# Patient Record
Sex: MALE | Race: WHITE | NOT HISPANIC OR LATINO | URBAN - METROPOLITAN AREA
[De-identification: names, ages, dates, MRNs, and addresses within clinical notes are randomized per-mention and may not be internally consistent; named-entity substitution may affect disease eponyms.]

---

## 2020-07-25 ENCOUNTER — TELEPHONE ENCOUNTER (OUTPATIENT)
Dept: URBAN - METROPOLITAN AREA CLINIC 13 | Facility: CLINIC | Age: 70
End: 2020-07-25

## 2020-07-26 ENCOUNTER — TELEPHONE ENCOUNTER (OUTPATIENT)
Dept: URBAN - METROPOLITAN AREA CLINIC 13 | Facility: CLINIC | Age: 70
End: 2020-07-26

## 2020-07-26 RX ORDER — FAMOTIDINE 40 MG/1
TABLET ORAL
Qty: 90 | Refills: 0 | Status: ACTIVE | COMMUNITY
Start: 2018-12-12

## 2020-07-26 RX ORDER — IBUPROFEN 200 MG/1
TAKE 1 TABLET 3 TIMES DAILY AS NEEDED TABLET, FILM COATED ORAL
Refills: 0 | Status: ACTIVE | COMMUNITY

## 2020-07-26 RX ORDER — TRIAMCINOLONE ACETONIDE 1 MG/G
CREAM TOPICAL
Qty: 15 | Refills: 0 | Status: ACTIVE | COMMUNITY
Start: 2019-05-07

## 2020-09-01 ENCOUNTER — OFFICE VISIT (OUTPATIENT)
Dept: URBAN - METROPOLITAN AREA CLINIC 113 | Facility: CLINIC | Age: 70
End: 2020-09-01

## 2021-12-17 ENCOUNTER — TELEPHONE ENCOUNTER (OUTPATIENT)
Dept: URBAN - METROPOLITAN AREA CLINIC 113 | Facility: CLINIC | Age: 71
End: 2021-12-17

## 2022-01-06 ENCOUNTER — OFFICE VISIT (OUTPATIENT)
Dept: URBAN - METROPOLITAN AREA CLINIC 113 | Facility: CLINIC | Age: 72
End: 2022-01-06
Payer: MEDICARE

## 2022-01-06 ENCOUNTER — LAB OUTSIDE AN ENCOUNTER (OUTPATIENT)
Dept: URBAN - METROPOLITAN AREA CLINIC 113 | Facility: CLINIC | Age: 72
End: 2022-01-06

## 2022-01-06 ENCOUNTER — WEB ENCOUNTER (OUTPATIENT)
Dept: URBAN - METROPOLITAN AREA CLINIC 113 | Facility: CLINIC | Age: 72
End: 2022-01-06

## 2022-01-06 ENCOUNTER — TELEPHONE ENCOUNTER (OUTPATIENT)
Dept: URBAN - METROPOLITAN AREA CLINIC 113 | Facility: CLINIC | Age: 72
End: 2022-01-06

## 2022-01-06 VITALS
TEMPERATURE: 98.2 F | DIASTOLIC BLOOD PRESSURE: 70 MMHG | WEIGHT: 171 LBS | HEART RATE: 66 BPM | BODY MASS INDEX: 23.16 KG/M2 | RESPIRATION RATE: 18 BRPM | SYSTOLIC BLOOD PRESSURE: 119 MMHG | HEIGHT: 72 IN

## 2022-01-06 DIAGNOSIS — K21.9 GASTROESOPHAGEAL REFLUX DISEASE, UNSPECIFIED WHETHER ESOPHAGITIS PRESENT: ICD-10-CM

## 2022-01-06 DIAGNOSIS — Z86.010 HISTORY OF COLON POLYPS: ICD-10-CM

## 2022-01-06 PROCEDURE — 99214 OFFICE O/P EST MOD 30 MIN: CPT | Performed by: INTERNAL MEDICINE

## 2022-01-06 RX ORDER — TRIAMCINOLONE ACETONIDE 1 MG/G
CREAM TOPICAL
Qty: 15 | Refills: 0 | Status: DISCONTINUED | COMMUNITY
Start: 2019-05-07

## 2022-01-06 RX ORDER — FAMOTIDINE 40 MG/1
TABLET ORAL
Qty: 90 | Refills: 0 | Status: DISCONTINUED | COMMUNITY
Start: 2018-12-12

## 2022-01-06 RX ORDER — IBUPROFEN 200 MG/1
TAKE 1 TABLET 3 TIMES DAILY AS NEEDED TABLET, FILM COATED ORAL
Refills: 0 | Status: DISCONTINUED | COMMUNITY

## 2022-01-06 NOTE — EXAM-PHYSICAL EXAM
He is alert and oriented to person place and situation no acute distress. There is no scleral icterus.

## 2022-01-25 ENCOUNTER — OFFICE VISIT (OUTPATIENT)
Dept: URBAN - METROPOLITAN AREA CLINIC 113 | Facility: CLINIC | Age: 72
End: 2022-01-25
Payer: MEDICARE

## 2022-01-25 ENCOUNTER — TELEPHONE ENCOUNTER (OUTPATIENT)
Dept: URBAN - METROPOLITAN AREA CLINIC 113 | Facility: CLINIC | Age: 72
End: 2022-01-25

## 2022-01-25 VITALS
TEMPERATURE: 97.3 F | SYSTOLIC BLOOD PRESSURE: 156 MMHG | WEIGHT: 163 LBS | DIASTOLIC BLOOD PRESSURE: 87 MMHG | BODY MASS INDEX: 22.08 KG/M2 | HEART RATE: 86 BPM | HEIGHT: 72 IN

## 2022-01-25 DIAGNOSIS — R10.13 EPIGASTRIC PAIN: ICD-10-CM

## 2022-01-25 DIAGNOSIS — R11.0 NAUSEA: ICD-10-CM

## 2022-01-25 DIAGNOSIS — R19.4 CHANGE IN BOWEL HABITS: ICD-10-CM

## 2022-01-25 DIAGNOSIS — Z86.010 HISTORY OF COLON POLYPS: ICD-10-CM

## 2022-01-25 DIAGNOSIS — K21.9 GASTROESOPHAGEAL REFLUX DISEASE, UNSPECIFIED WHETHER ESOPHAGITIS PRESENT: ICD-10-CM

## 2022-01-25 PROCEDURE — 99214 OFFICE O/P EST MOD 30 MIN: CPT | Performed by: INTERNAL MEDICINE

## 2022-01-25 RX ORDER — METRONIDAZOLE 500 MG/1
1 TABLET TABLET ORAL THREE TIMES A DAY
Qty: 21 TABLET | Refills: 0 | OUTPATIENT
Start: 2022-01-25 | End: 2022-02-01

## 2022-01-25 NOTE — EXAM-PHYSICAL EXAM
He is alert and oriented to person place and situation no acute distress.  There is no scleral icterus.  Abdomen is soft nondistended with some minimal epigastric tenderness to deep palpation.  No masses or organomegaly are appreciated.

## 2022-01-25 NOTE — HPI-TODAY'S VISIT:
Patient is a very pleasant 71-year-old gentleman who returns today for follow-up.  I last saw the patient in August 2019.  At that time I was seeing him with abdominal pain.  Extensive work-up only revealed gallbladder sludge.  Celiac panel was negative.  Laboratory testing performed in October 2021 revealed a normal CBC.  Also a normal CMP other than a sodium of 136.  Patient underwent cholecystectomy in November 2021. The patient states that both before his cholecystectomy and after he is having problems of heartburn in the evening especially if he has a mixed drink. He often has to sleep with the head of the bed up. He does not take any acid suppressive medications. He does take occasional nonsteroidal anti-inflammatory agents. He states his last colonoscopy was 5 years ago and 2 polyps were found and he was told to have a repeat in 5 years. The patient comes in with a new problem.  He states he went to [place] a few weeks ago to do some surfing.  While he was there he developed severe right arm swelling.  This has improved dramatically.  He thought this might be gout so he took prednisone tablets.  After that he started developing severe epigastric abdominal pain with nausea and vomiting.  This pain is made much worse with eating.  He has had no diarrhea.

## 2022-01-26 LAB
A/G RATIO: 1.5
ALBUMIN: 4.4
ALKALINE PHOSPHATASE: 91
ALT (SGPT): 22
AMYLASE: 75
AST (SGOT): 20
BASO (ABSOLUTE): 0
BASOS: 0
BILIRUBIN, TOTAL: 0.6
BUN/CREATININE RATIO: 20
BUN: 19
CALCIUM: 10.3
CARBON DIOXIDE, TOTAL: 26
CHLORIDE: 97
CREATININE: 0.97
EGFR IF AFRICN AM: 90
EGFR IF NONAFRICN AM: 78
EOS (ABSOLUTE): 0
EOS: 0
GLOBULIN, TOTAL: 3
GLUCOSE: 113
HEMATOCRIT: 40.4
HEMATOLOGY COMMENTS:: (no result)
HEMOGLOBIN: 13.7
IMMATURE CELLS: (no result)
IMMATURE GRANS (ABS): 0
IMMATURE GRANULOCYTES: 0
LYMPHS (ABSOLUTE): 1.3
LYMPHS: 11
MCH: 28.8
MCHC: 33.9
MCV: 85
MONOCYTES(ABSOLUTE): 0.9
MONOCYTES: 8
NEUTROPHILS (ABSOLUTE): 9
NEUTROPHILS: 81
NRBC: (no result)
PLATELETS: 418
POTASSIUM: 3.7
PROTEIN, TOTAL: 7.4
RBC: 4.76
RDW: 13.3
SODIUM: 142
WBC: 11.3

## 2022-01-27 ENCOUNTER — TELEPHONE ENCOUNTER (OUTPATIENT)
Dept: URBAN - METROPOLITAN AREA CLINIC 113 | Facility: CLINIC | Age: 72
End: 2022-01-27

## 2022-01-31 ENCOUNTER — TELEPHONE ENCOUNTER (OUTPATIENT)
Dept: URBAN - METROPOLITAN AREA CLINIC 113 | Facility: CLINIC | Age: 72
End: 2022-01-31

## 2022-02-07 ENCOUNTER — OFFICE VISIT (OUTPATIENT)
Dept: URBAN - METROPOLITAN AREA SURGERY CENTER 25 | Facility: SURGERY CENTER | Age: 72
End: 2022-02-07
Payer: MEDICARE

## 2022-02-07 ENCOUNTER — TELEPHONE ENCOUNTER (OUTPATIENT)
Dept: URBAN - METROPOLITAN AREA CLINIC 113 | Facility: CLINIC | Age: 72
End: 2022-02-07

## 2022-02-07 ENCOUNTER — CLAIMS CREATED FROM THE CLAIM WINDOW (OUTPATIENT)
Dept: URBAN - METROPOLITAN AREA CLINIC 4 | Facility: CLINIC | Age: 72
End: 2022-02-07
Payer: MEDICARE

## 2022-02-07 ENCOUNTER — LAB OUTSIDE AN ENCOUNTER (OUTPATIENT)
Dept: URBAN - METROPOLITAN AREA CLINIC 113 | Facility: CLINIC | Age: 72
End: 2022-02-07

## 2022-02-07 DIAGNOSIS — K31.89 STENOSIS OF STOMACH: ICD-10-CM

## 2022-02-07 DIAGNOSIS — K31.89 FOCAL FOVEOLAR HYPERPLASIA: ICD-10-CM

## 2022-02-07 DIAGNOSIS — K31.1 PYLORIC STENOSIS: ICD-10-CM

## 2022-02-07 DIAGNOSIS — K25.7 CHRONIC GASTRIC ULCER WITHOUT HEMORRHAGE OR PERFORATION: ICD-10-CM

## 2022-02-07 PROCEDURE — 88305 TISSUE EXAM BY PATHOLOGIST: CPT | Performed by: PATHOLOGY

## 2022-02-07 PROCEDURE — 88342 IMHCHEM/IMCYTCHM 1ST ANTB: CPT | Performed by: PATHOLOGY

## 2022-02-07 PROCEDURE — 43239 EGD BIOPSY SINGLE/MULTIPLE: CPT | Performed by: INTERNAL MEDICINE

## 2022-02-07 PROCEDURE — G8907 PT DOC NO EVENTS ON DISCHARG: HCPCS | Performed by: INTERNAL MEDICINE

## 2022-02-08 ENCOUNTER — TELEPHONE ENCOUNTER (OUTPATIENT)
Dept: URBAN - METROPOLITAN AREA CLINIC 113 | Facility: CLINIC | Age: 72
End: 2022-02-08

## 2022-02-14 ENCOUNTER — OFFICE VISIT (OUTPATIENT)
Dept: URBAN - METROPOLITAN AREA MEDICAL CENTER 19 | Facility: MEDICAL CENTER | Age: 72
End: 2022-02-14
Payer: MEDICARE

## 2022-02-14 DIAGNOSIS — K31.1: ICD-10-CM

## 2022-02-14 DIAGNOSIS — K22.89 ESOPHAGEAL DILATATION: ICD-10-CM

## 2022-02-14 PROCEDURE — 43239 EGD BIOPSY SINGLE/MULTIPLE: CPT | Performed by: INTERNAL MEDICINE

## 2022-02-16 ENCOUNTER — TELEPHONE ENCOUNTER (OUTPATIENT)
Dept: URBAN - METROPOLITAN AREA CLINIC 113 | Facility: CLINIC | Age: 72
End: 2022-02-16

## 2022-02-21 ENCOUNTER — TELEPHONE ENCOUNTER (OUTPATIENT)
Dept: URBAN - METROPOLITAN AREA CLINIC 113 | Facility: CLINIC | Age: 72
End: 2022-02-21

## 2022-03-21 ENCOUNTER — TELEPHONE ENCOUNTER (OUTPATIENT)
Dept: URBAN - METROPOLITAN AREA CLINIC 113 | Facility: CLINIC | Age: 72
End: 2022-03-21

## 2022-03-22 ENCOUNTER — TELEPHONE ENCOUNTER (OUTPATIENT)
Dept: URBAN - METROPOLITAN AREA CLINIC 113 | Facility: CLINIC | Age: 72
End: 2022-03-22

## 2022-06-20 ENCOUNTER — TELEPHONE ENCOUNTER (OUTPATIENT)
Dept: URBAN - METROPOLITAN AREA CLINIC 113 | Facility: CLINIC | Age: 72
End: 2022-06-20

## 2022-10-03 ENCOUNTER — TELEPHONE ENCOUNTER (OUTPATIENT)
Dept: URBAN - METROPOLITAN AREA CLINIC 113 | Facility: CLINIC | Age: 72
End: 2022-10-03

## 2022-10-03 ENCOUNTER — WEB ENCOUNTER (OUTPATIENT)
Dept: URBAN - METROPOLITAN AREA CLINIC 113 | Facility: CLINIC | Age: 72
End: 2022-10-03

## 2022-10-04 ENCOUNTER — CLAIMS CREATED FROM THE CLAIM WINDOW (OUTPATIENT)
Dept: URBAN - METROPOLITAN AREA MEDICAL CENTER 19 | Facility: MEDICAL CENTER | Age: 72
End: 2022-10-04
Payer: MEDICARE

## 2022-10-04 ENCOUNTER — OFFICE VISIT (OUTPATIENT)
Dept: URBAN - METROPOLITAN AREA CLINIC 113 | Facility: CLINIC | Age: 72
End: 2022-10-04

## 2022-10-04 ENCOUNTER — TELEPHONE ENCOUNTER (OUTPATIENT)
Dept: URBAN - METROPOLITAN AREA CLINIC 113 | Facility: CLINIC | Age: 72
End: 2022-10-04

## 2022-10-04 DIAGNOSIS — N17.9 AKI (ACUTE KIDNEY INJURY): ICD-10-CM

## 2022-10-04 DIAGNOSIS — D72.828 HIGH BLOOD MONOCYTE COUNT: ICD-10-CM

## 2022-10-04 DIAGNOSIS — A09 INFECTIOUS GASTROENTERITIS AND COLITIS, UNSPECIFIED: ICD-10-CM

## 2022-10-04 DIAGNOSIS — Z98.890 H/O ABDOMINAL SURGERY: ICD-10-CM

## 2022-10-04 DIAGNOSIS — E87.1 HYPONATREMIA: ICD-10-CM

## 2022-10-04 PROCEDURE — 99222 1ST HOSP IP/OBS MODERATE 55: CPT | Performed by: INTERNAL MEDICINE

## 2022-10-05 ENCOUNTER — CLAIMS CREATED FROM THE CLAIM WINDOW (OUTPATIENT)
Dept: URBAN - METROPOLITAN AREA MEDICAL CENTER 19 | Facility: MEDICAL CENTER | Age: 72
End: 2022-10-05
Payer: MEDICARE

## 2022-10-05 DIAGNOSIS — M10.9 GOUT ATTACK: ICD-10-CM

## 2022-10-05 DIAGNOSIS — N17.9 AKI (ACUTE KIDNEY INJURY): ICD-10-CM

## 2022-10-05 DIAGNOSIS — D72.828 HIGH BLOOD MONOCYTE COUNT: ICD-10-CM

## 2022-10-05 DIAGNOSIS — A09 INFECTIOUS GASTROENTERITIS AND COLITIS, UNSPECIFIED: ICD-10-CM

## 2022-10-05 PROCEDURE — 99232 SBSQ HOSP IP/OBS MODERATE 35: CPT | Performed by: INTERNAL MEDICINE

## 2022-10-06 ENCOUNTER — TELEPHONE ENCOUNTER (OUTPATIENT)
Dept: URBAN - METROPOLITAN AREA CLINIC 113 | Facility: CLINIC | Age: 72
End: 2022-10-06

## 2022-10-18 ENCOUNTER — WEB ENCOUNTER (OUTPATIENT)
Dept: URBAN - METROPOLITAN AREA CLINIC 113 | Facility: CLINIC | Age: 72
End: 2022-10-18

## 2022-10-18 ENCOUNTER — OFFICE VISIT (OUTPATIENT)
Dept: URBAN - METROPOLITAN AREA CLINIC 113 | Facility: CLINIC | Age: 72
End: 2022-10-18
Payer: MEDICARE

## 2022-10-18 VITALS
HEIGHT: 72 IN | SYSTOLIC BLOOD PRESSURE: 104 MMHG | TEMPERATURE: 97.5 F | HEART RATE: 92 BPM | RESPIRATION RATE: 16 BRPM | BODY MASS INDEX: 21.64 KG/M2 | DIASTOLIC BLOOD PRESSURE: 69 MMHG | WEIGHT: 159.8 LBS

## 2022-10-18 DIAGNOSIS — K21.9 GASTROESOPHAGEAL REFLUX DISEASE, UNSPECIFIED WHETHER ESOPHAGITIS PRESENT: ICD-10-CM

## 2022-10-18 DIAGNOSIS — K31.1 PYLORIC STENOSIS IN ADULT: ICD-10-CM

## 2022-10-18 DIAGNOSIS — Z86.010 HISTORY OF COLON POLYPS: ICD-10-CM

## 2022-10-18 DIAGNOSIS — R19.7 DIARRHEA OF PRESUMED INFECTIOUS ORIGIN: ICD-10-CM

## 2022-10-18 PROCEDURE — 99214 OFFICE O/P EST MOD 30 MIN: CPT | Performed by: INTERNAL MEDICINE

## 2022-10-18 RX ORDER — COLCHICINE 0.6 MG/1
1 TABLET TABLET, FILM COATED ORAL
Status: ACTIVE | COMMUNITY

## 2022-10-18 RX ORDER — ALLOPURINOL 100 MG/1
1 TABLET TABLET ORAL ONCE A DAY
Status: ACTIVE | COMMUNITY

## 2022-10-18 NOTE — EXAM-PHYSICAL EXAM
He is alert and oriented to person place and situation no acute distress.  The somewhat pale in appearance.

## 2022-10-18 NOTE — HPI-TODAY'S VISIT:
Patient is a very pleasant 71-year-old gentleman who returns today for follow-up.  I last saw the patient in August 2019.  At that time I was seeing him with abdominal pain.  Extensive work-up only revealed gallbladder sludge.  Celiac panel was negative.  Laboratory testing performed in October 2021 revealed a normal CBC.  Also a normal CMP other than a sodium of 136.  Patient underwent cholecystectomy in November 2021. The patient states that both before his cholecystectomy and after he is having problems of heartburn in the evening especially if he has a mixed drink. He often has to sleep with the head of the bed up. He does not take any acid suppressive medications. He does take occasional nonsteroidal anti-inflammatory agents. He states his last colonoscopy was 5 years ago and 2 polyps were found and he was told to have a repeat in 5 years. The patient comes in with a new problem.  He states he went to [place] a few weeks ago to do some surfing.  While he was there he developed severe right arm swelling.  This has improved dramatically.  He thought this might be gout so he took prednisone tablets.  After that he started developing severe epigastric abdominal pain with nausea and vomiting.  This pain is made much worse with eating.  He has had no diarrhea. Interval history.  EGD performed February 7 revealed a tight pylorus stenosis.  In the interim he underwent surgical resection.  He was recently seen in consultation October 4 for diarrhea.  He had a high white cell count.  This was consistent with an acute gastroenteritis.  He resolved with fluid resuscitation.  He had acute renal failure associated with it. The patient was doing well up until 1 to 2 days ago when he started developing diarrhea bloating and some nausea.  He does not note any exposure.  His wife is not ill.  He did resume colchicine once he returned home.

## 2022-10-19 ENCOUNTER — WEB ENCOUNTER (OUTPATIENT)
Dept: URBAN - METROPOLITAN AREA CLINIC 113 | Facility: CLINIC | Age: 72
End: 2022-10-19

## 2022-10-19 ENCOUNTER — TELEPHONE ENCOUNTER (OUTPATIENT)
Dept: URBAN - METROPOLITAN AREA CLINIC 113 | Facility: CLINIC | Age: 72
End: 2022-10-19

## 2022-10-19 LAB
A/G RATIO: 1.5
ABSOLUTE BASOPHILS: 18
ABSOLUTE EOSINOPHILS: 644
ABSOLUTE LYMPHOCYTES: 1987
ABSOLUTE MONOCYTES: 1343
ABSOLUTE NEUTROPHILS: (no result)
ALBUMIN: 3.9
ALKALINE PHOSPHATASE: 129
ALT (SGPT): 17
AST (SGOT): 15
BASOPHILS: 0.1
BILIRUBIN, TOTAL: 0.9
BUN/CREATININE RATIO: 28
BUN: 36
CALCIUM: 9.3
CARBON DIOXIDE, TOTAL: 20
CHLORIDE: 99
CREATININE: 1.27
EGFR: 60
EOSINOPHILS: 3.5
GLOBULIN, TOTAL: 2.6
GLUCOSE: 99
HEMATOCRIT: 44
HEMOGLOBIN: 15
LYMPHOCYTES: 10.8
MAGNESIUM: 1.8
MCH: 29.4
MCHC: 34.1
MCV: 86.1
MONOCYTES: 7.3
MPV: 9.4
NEUTROPHILS: 78.3
PLATELET COUNT: 412
POTASSIUM: 4.7
PROTEIN, TOTAL: 6.5
RDW: 12.8
RED BLOOD CELL COUNT: 5.11
SODIUM: 133
WHITE BLOOD CELL COUNT: 18.4

## 2022-10-20 ENCOUNTER — CLAIMS CREATED FROM THE CLAIM WINDOW (OUTPATIENT)
Dept: URBAN - METROPOLITAN AREA MEDICAL CENTER 43 | Facility: MEDICAL CENTER | Age: 72
End: 2022-10-20
Payer: MEDICARE

## 2022-10-20 DIAGNOSIS — R19.7 ACUTE DIARRHEA: ICD-10-CM

## 2022-10-20 DIAGNOSIS — Z86.010 ADENOMAS PERSONAL HISTORY OF COLONIC POLYPS: ICD-10-CM

## 2022-10-20 DIAGNOSIS — N17.8 OTHER ACUTE KIDNEY FAILURE: ICD-10-CM

## 2022-10-20 DIAGNOSIS — D72.829 ELEVATED WBC COUNT: ICD-10-CM

## 2022-10-20 PROCEDURE — 99223 1ST HOSP IP/OBS HIGH 75: CPT | Performed by: PHYSICIAN ASSISTANT

## 2022-10-21 ENCOUNTER — CLAIMS CREATED FROM THE CLAIM WINDOW (OUTPATIENT)
Dept: URBAN - METROPOLITAN AREA MEDICAL CENTER 43 | Facility: MEDICAL CENTER | Age: 72
End: 2022-10-21
Payer: MEDICARE

## 2022-10-21 DIAGNOSIS — K21.9 ACID REFLUX: ICD-10-CM

## 2022-10-21 DIAGNOSIS — R19.7 DIARRHEA, UNSPECIFIED: ICD-10-CM

## 2022-10-21 DIAGNOSIS — N17.8 OTHER ACUTE KIDNEY FAILURE: ICD-10-CM

## 2022-10-21 DIAGNOSIS — D72.829 ELEVATED WBC COUNT: ICD-10-CM

## 2022-10-21 LAB
CAMPYLOBACTER SPP. AG,EIA: (no result)
OVA AND PARASITES, CONC AND PERM SMEAR: (no result)
SALMONELLA AND SHIGELLA, CULTURE: (no result)
SHIGA TOXINS, EIA W/RFL TO E.COLI O157 CULTURE: (no result)

## 2022-10-21 PROCEDURE — 99232 SBSQ HOSP IP/OBS MODERATE 35: CPT | Performed by: PHYSICIAN ASSISTANT

## 2022-10-22 ENCOUNTER — CLAIMS CREATED FROM THE CLAIM WINDOW (OUTPATIENT)
Dept: URBAN - METROPOLITAN AREA MEDICAL CENTER 43 | Facility: MEDICAL CENTER | Age: 72
End: 2022-10-22
Payer: MEDICARE

## 2022-10-22 DIAGNOSIS — D72.10 EOSINOPHILIA: ICD-10-CM

## 2022-10-22 DIAGNOSIS — K21.9 ACID REFLUX: ICD-10-CM

## 2022-10-22 DIAGNOSIS — R19.7 DIARRHEA, UNSPECIFIED: ICD-10-CM

## 2022-10-22 PROCEDURE — 99232 SBSQ HOSP IP/OBS MODERATE 35: CPT | Performed by: INTERNAL MEDICINE

## 2022-10-23 ENCOUNTER — CLAIMS CREATED FROM THE CLAIM WINDOW (OUTPATIENT)
Dept: URBAN - METROPOLITAN AREA MEDICAL CENTER 43 | Facility: MEDICAL CENTER | Age: 72
End: 2022-10-23
Payer: MEDICARE

## 2022-10-23 DIAGNOSIS — D72.10 EOSINOPHILIA: ICD-10-CM

## 2022-10-23 DIAGNOSIS — R19.7 DIARRHEA, UNSPECIFIED: ICD-10-CM

## 2022-10-23 DIAGNOSIS — D72.829 ELEVATED WHITE BLOOD CELL COUNT, UNSPECIFIED: ICD-10-CM

## 2022-10-23 PROCEDURE — 99232 SBSQ HOSP IP/OBS MODERATE 35: CPT | Performed by: INTERNAL MEDICINE

## 2022-10-24 ENCOUNTER — CLAIMS CREATED FROM THE CLAIM WINDOW (OUTPATIENT)
Dept: URBAN - METROPOLITAN AREA MEDICAL CENTER 43 | Facility: MEDICAL CENTER | Age: 72
End: 2022-10-24
Payer: MEDICARE

## 2022-10-24 DIAGNOSIS — K29.60 ADENOPAPILLOMATOSIS GASTRICA: ICD-10-CM

## 2022-10-24 DIAGNOSIS — K22.10 BARRETT'S ESOPHAGEAL ULCERATION: ICD-10-CM

## 2022-10-24 DIAGNOSIS — K31.A11 INTESTINAL METAPLASIA OF ANTRUM OF STOMACH WITHOUT DYSPLASIA: ICD-10-CM

## 2022-10-24 DIAGNOSIS — R19.7 ACUTE DIARRHEA: ICD-10-CM

## 2022-10-24 DIAGNOSIS — K63.89 BACTERIAL OVERGROWTH SYNDROME: ICD-10-CM

## 2022-10-24 PROBLEM — 770924008 GOUT ATTACK: Status: ACTIVE | Noted: 2022-10-24

## 2022-10-24 PROBLEM — 414478003 INCREASED BLOOD LEUKOCYTE NUMBER: Status: ACTIVE | Noted: 2022-10-24

## 2022-10-24 PROCEDURE — 43239 EGD BIOPSY SINGLE/MULTIPLE: CPT | Performed by: INTERNAL MEDICINE

## 2022-10-24 PROCEDURE — 45331 SIGMOIDOSCOPY AND BIOPSY: CPT | Performed by: INTERNAL MEDICINE

## 2022-10-25 ENCOUNTER — CLAIMS CREATED FROM THE CLAIM WINDOW (OUTPATIENT)
Dept: URBAN - METROPOLITAN AREA MEDICAL CENTER 43 | Facility: MEDICAL CENTER | Age: 72
End: 2022-10-25
Payer: MEDICARE

## 2022-10-25 DIAGNOSIS — R19.7 DIARRHEA, UNSPECIFIED: ICD-10-CM

## 2022-10-25 DIAGNOSIS — Z86.010 ADENOMAS PERSONAL HISTORY OF COLONIC POLYPS: ICD-10-CM

## 2022-10-25 DIAGNOSIS — D72.829 ELEVATED WHITE BLOOD CELL COUNT, UNSPECIFIED: ICD-10-CM

## 2022-10-25 PROCEDURE — 99232 SBSQ HOSP IP/OBS MODERATE 35: CPT | Performed by: PHYSICIAN ASSISTANT

## 2022-10-26 ENCOUNTER — OFFICE VISIT (OUTPATIENT)
Dept: URBAN - METROPOLITAN AREA CLINIC 113 | Facility: CLINIC | Age: 72
End: 2022-10-26

## 2022-10-26 ENCOUNTER — TELEPHONE ENCOUNTER (OUTPATIENT)
Dept: URBAN - METROPOLITAN AREA CLINIC 6 | Facility: CLINIC | Age: 72
End: 2022-10-26

## 2022-10-29 PROBLEM — 386789004 EOSINOPHILIA: Status: ACTIVE | Noted: 2022-10-29

## 2022-10-31 ENCOUNTER — WEB ENCOUNTER (OUTPATIENT)
Dept: URBAN - METROPOLITAN AREA CLINIC 113 | Facility: CLINIC | Age: 72
End: 2022-10-31

## 2022-11-08 ENCOUNTER — TELEPHONE ENCOUNTER (OUTPATIENT)
Dept: URBAN - METROPOLITAN AREA CLINIC 113 | Facility: CLINIC | Age: 72
End: 2022-11-08

## 2022-12-22 ENCOUNTER — CLAIMS CREATED FROM THE CLAIM WINDOW (OUTPATIENT)
Dept: URBAN - METROPOLITAN AREA CLINIC 113 | Facility: CLINIC | Age: 72
End: 2022-12-22
Payer: MEDICARE

## 2022-12-22 ENCOUNTER — LAB OUTSIDE AN ENCOUNTER (OUTPATIENT)
Dept: URBAN - METROPOLITAN AREA CLINIC 113 | Facility: CLINIC | Age: 72
End: 2022-12-22

## 2022-12-22 VITALS
TEMPERATURE: 97.3 F | HEART RATE: 64 BPM | RESPIRATION RATE: 16 BRPM | SYSTOLIC BLOOD PRESSURE: 146 MMHG | WEIGHT: 168 LBS | BODY MASS INDEX: 22.75 KG/M2 | HEIGHT: 72 IN | DIASTOLIC BLOOD PRESSURE: 77 MMHG

## 2022-12-22 DIAGNOSIS — Z80.0 FAMILY HISTORY OF COLON CANCER IN FATHER: ICD-10-CM

## 2022-12-22 DIAGNOSIS — K31.1 PYLORIC STENOSIS IN ADULT: ICD-10-CM

## 2022-12-22 DIAGNOSIS — Z86.010 HISTORY OF COLON POLYPS: ICD-10-CM

## 2022-12-22 DIAGNOSIS — K21.9 GASTROESOPHAGEAL REFLUX DISEASE, UNSPECIFIED WHETHER ESOPHAGITIS PRESENT: ICD-10-CM

## 2022-12-22 DIAGNOSIS — K52.1 DRUG-INDUCED DIARRHEA: ICD-10-CM

## 2022-12-22 PROCEDURE — 99214 OFFICE O/P EST MOD 30 MIN: CPT | Performed by: INTERNAL MEDICINE

## 2022-12-22 RX ORDER — ALLOPURINOL 300 MG/1
1/2 TAB TABLET ORAL ONCE A DAY
Status: ACTIVE | COMMUNITY

## 2022-12-22 RX ORDER — RABEPRAZOLE SODIUM 20 MG/1
1 TABLET TABLET, DELAYED RELEASE ORAL ONCE A DAY
Qty: 30 TABLET | Refills: 3 | OUTPATIENT
Start: 2022-12-22

## 2022-12-22 RX ORDER — COLCHICINE 0.6 MG/1
1 TABLET TABLET, FILM COATED ORAL
Status: ON HOLD | COMMUNITY

## 2022-12-22 NOTE — HPI-TODAY'S VISIT:
Patient is a very pleasant 71-year-old gentleman who returns today for follow-up.  I last saw the patient in August 2019.  At that time I was seeing him with abdominal pain.  Extensive work-up only revealed gallbladder sludge.  Celiac panel was negative.  Laboratory testing performed in October 2021 revealed a normal CBC.  Also a normal CMP other than a sodium of 136.  Patient underwent cholecystectomy in November 2021. The patient states that both before his cholecystectomy and after he is having problems of heartburn in the evening especially if he has a mixed drink. He often has to sleep with the head of the bed up. He does not take any acid suppressive medications. He does take occasional nonsteroidal anti-inflammatory agents. He states his last colonoscopy was 5 years ago and 2 polyps were found and he was told to have a repeat in 5 years. The patient comes in with a new problem.  He states he went to [place] a few weeks ago to do some surfing.  While he was there he developed severe right arm swelling.  This has improved dramatically.  He thought this might be gout so he took prednisone tablets.  After that he started developing severe epigastric abdominal pain with nausea and vomiting.  This pain is made much worse with eating.  He has had no diarrhea. Interval history.  EGD performed February 7 revealed a tight pylorus stenosis.  In the interim he underwent surgical resection.  He was recently seen in consultation October 4 for diarrhea.  He had a high white cell count.  This was consistent with an acute gastroenteritis.  He resolved with fluid resuscitation.  He had acute renal failure associated with it. The patient was doing well up until 1 to 2 days ago when he started developing diarrhea bloating and some nausea.  He does not note any exposure.  His wife is not ill.  He did resume colchicine once he returned home. Interval history, 12/22/2022.  Upper endoscopy performed October 19 was unremarkable.  Simply showed his post surgical anatomy.  Flexible sigmoidoscopy was also unremarkable.  Biopsies of the small bowel were negative.  Biopsies of the stomach were negative.  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 The patient states he is doing very well now that he is staying off colchicine.  He does occasionally take prednisone for his gout.  His last colonoscopy as noted in the past was in 2018.  His father had a history of colon cancer so he is due in 2023.

## 2023-01-13 ENCOUNTER — TELEPHONE ENCOUNTER (OUTPATIENT)
Dept: URBAN - METROPOLITAN AREA CLINIC 113 | Facility: CLINIC | Age: 73
End: 2023-01-13

## 2023-01-19 ENCOUNTER — LAB OUTSIDE AN ENCOUNTER (OUTPATIENT)
Dept: URBAN - METROPOLITAN AREA CLINIC 113 | Facility: CLINIC | Age: 73
End: 2023-01-19

## 2023-01-19 ENCOUNTER — TELEPHONE ENCOUNTER (OUTPATIENT)
Dept: URBAN - METROPOLITAN AREA CLINIC 113 | Facility: CLINIC | Age: 73
End: 2023-01-19

## 2023-01-23 ENCOUNTER — WEB ENCOUNTER (OUTPATIENT)
Dept: URBAN - METROPOLITAN AREA CLINIC 107 | Facility: CLINIC | Age: 73
End: 2023-01-23

## 2023-01-23 ENCOUNTER — LAB OUTSIDE AN ENCOUNTER (OUTPATIENT)
Dept: URBAN - METROPOLITAN AREA CLINIC 107 | Facility: CLINIC | Age: 73
End: 2023-01-23

## 2023-01-23 ENCOUNTER — OFFICE VISIT (OUTPATIENT)
Dept: URBAN - METROPOLITAN AREA CLINIC 107 | Facility: CLINIC | Age: 73
End: 2023-01-23
Payer: MEDICARE

## 2023-01-23 VITALS
BODY MASS INDEX: 22.21 KG/M2 | TEMPERATURE: 98.1 F | DIASTOLIC BLOOD PRESSURE: 67 MMHG | HEART RATE: 78 BPM | HEIGHT: 72 IN | SYSTOLIC BLOOD PRESSURE: 109 MMHG | WEIGHT: 164 LBS

## 2023-01-23 DIAGNOSIS — Z86.010 HISTORY OF COLON POLYPS: ICD-10-CM

## 2023-01-23 DIAGNOSIS — Z80.0 FAMILY HISTORY OF COLON CANCER IN FATHER: ICD-10-CM

## 2023-01-23 DIAGNOSIS — R19.7 DIARRHEA OF PRESUMED INFECTIOUS ORIGIN: ICD-10-CM

## 2023-01-23 DIAGNOSIS — K31.1 PYLORIC STENOSIS IN ADULT: ICD-10-CM

## 2023-01-23 DIAGNOSIS — K21.9 GASTROESOPHAGEAL REFLUX DISEASE, UNSPECIFIED WHETHER ESOPHAGITIS PRESENT: ICD-10-CM

## 2023-01-23 DIAGNOSIS — K52.1 DRUG-INDUCED DIARRHEA: ICD-10-CM

## 2023-01-23 DIAGNOSIS — R10.13 EPIGASTRIC PAIN: ICD-10-CM

## 2023-01-23 PROBLEM — 235595009: Status: ACTIVE | Noted: 2022-01-06

## 2023-01-23 PROCEDURE — 99214 OFFICE O/P EST MOD 30 MIN: CPT | Performed by: INTERNAL MEDICINE

## 2023-01-23 RX ORDER — ALLOPURINOL 300 MG/1
1/2 TAB TABLET ORAL ONCE A DAY
Status: ACTIVE | COMMUNITY

## 2023-01-23 RX ORDER — RABEPRAZOLE SODIUM 20 MG/1
1 TABLET TABLET, DELAYED RELEASE ORAL ONCE A DAY
Qty: 30 TABLET | Refills: 3 | Status: ACTIVE | COMMUNITY
Start: 2022-12-22

## 2023-01-23 RX ORDER — COLCHICINE 0.6 MG/1
1 TABLET TABLET, FILM COATED ORAL
Status: ON HOLD | COMMUNITY

## 2023-01-23 NOTE — HPI-TODAY'S VISIT:
Patient is a very pleasant 71-year-old gentleman who returns today for follow-up.  I last saw the patient in August 2019.  At that time I was seeing him with abdominal pain.  Extensive work-up only revealed gallbladder sludge.  Celiac panel was negative.  Laboratory testing performed in October 2021 revealed a normal CBC.  Also a normal CMP other than a sodium of 136.  Patient underwent cholecystectomy in November 2021. The patient states that both before his cholecystectomy and after he is having problems of heartburn in the evening especially if he has a mixed drink. He often has to sleep with the head of the bed up. He does not take any acid suppressive medications. He does take occasional nonsteroidal anti-inflammatory agents. He states his last colonoscopy was 5 years ago and 2 polyps were found and he was told to have a repeat in 5 years. The patient comes in with a new problem.  He states he went to [place] a few weeks ago to do some surfing.  While he was there he developed severe right arm swelling.  This has improved dramatically.  He thought this might be gout so he took prednisone tablets.  After that he started developing severe epigastric abdominal pain with nausea and vomiting.  This pain is made much worse with eating.  He has had no diarrhea. Interval history.  EGD performed February 7 revealed a tight pylorus stenosis.  In the interim he underwent surgical resection.  He was recently seen in consultation October 4 for diarrhea.  He had a high white cell count.  This was consistent with an acute gastroenteritis.  He resolved with fluid resuscitation.  He had acute renal failure associated with it. The patient was doing well up until 1 to 2 days ago when he started developing diarrhea bloating and some nausea.  He does not note any exposure.  His wife is not ill.  He did resume colchicine once he returned home. Interval history, 12/22/2022.  Upper endoscopy performed October 19 was unremarkable.  Simply showed his post surgical anatomy.  Flexible sigmoidoscopy was also unremarkable.  Biopsies of the small bowel were negative.  Biopsies of the stomach were negative.  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 The patient states he is doing very well now that he is staying off colchicine.  He does occasionally take prednisone for his gout.  His last colonoscopy as noted in the past was in 2018.  His father had a history of colon cancer so he is due in 2023. Interval history, 1/23/2023.  The patient redeveloped diarrhea last week.  He states just before the diarrhea had normal laboratory testing by his primary care physician.  He states the diarrhea lasted about 7 days and is now gone away.  He has started on rabeprazole and has helped his heartburn greatly.

## 2023-01-24 ENCOUNTER — TELEPHONE ENCOUNTER (OUTPATIENT)
Dept: URBAN - METROPOLITAN AREA CLINIC 113 | Facility: CLINIC | Age: 73
End: 2023-01-24

## 2023-01-24 LAB — CLOSTRIDIUM DIFFICILE: (no result)

## 2023-01-30 ENCOUNTER — WEB ENCOUNTER (OUTPATIENT)
Dept: URBAN - METROPOLITAN AREA CLINIC 113 | Facility: CLINIC | Age: 73
End: 2023-01-30

## 2023-01-30 RX ORDER — COLCHICINE 0.6 MG/1
1 TABLET TABLET, FILM COATED ORAL
Status: ON HOLD | COMMUNITY

## 2023-01-30 RX ORDER — ALLOPURINOL 300 MG/1
1/2 TAB TABLET ORAL ONCE A DAY
Status: ACTIVE | COMMUNITY

## 2023-01-30 RX ORDER — RABEPRAZOLE SODIUM 20 MG/1
1 TABLET TABLET, DELAYED RELEASE ORAL ONCE A DAY
Qty: 30 TABLET | Refills: 3 | Status: ACTIVE | COMMUNITY
Start: 2022-12-22

## 2023-01-30 RX ORDER — CHOLESTYRAMINE 4 G/9G
1 SCOOP POWDER, FOR SUSPENSION ORAL TWICE DAILY
Qty: 4 GM | Refills: 1 | OUTPATIENT
Start: 2023-02-06

## 2023-02-02 PROBLEM — 43240000: Status: ACTIVE | Noted: 2023-02-02

## 2023-02-02 PROBLEM — 428283002: Status: ACTIVE | Noted: 2022-01-06

## 2023-02-03 ENCOUNTER — WEB ENCOUNTER (OUTPATIENT)
Dept: URBAN - METROPOLITAN AREA SURGERY CENTER 25 | Facility: SURGERY CENTER | Age: 73
End: 2023-02-03

## 2023-02-08 ENCOUNTER — CLAIMS CREATED FROM THE CLAIM WINDOW (OUTPATIENT)
Dept: URBAN - METROPOLITAN AREA CLINIC 4 | Facility: CLINIC | Age: 73
End: 2023-02-08
Payer: MEDICARE

## 2023-02-08 ENCOUNTER — OFFICE VISIT (OUTPATIENT)
Dept: URBAN - METROPOLITAN AREA SURGERY CENTER 25 | Facility: SURGERY CENTER | Age: 73
End: 2023-02-08
Payer: MEDICARE

## 2023-02-08 DIAGNOSIS — R19.7 DIARRHEA: ICD-10-CM

## 2023-02-08 DIAGNOSIS — D12.3 ADENOMA OF TRANSVERSE COLON: ICD-10-CM

## 2023-02-08 DIAGNOSIS — K63.89 OTHER SPECIFIED DISEASES OF INTESTINE: ICD-10-CM

## 2023-02-08 PROCEDURE — 88305 TISSUE EXAM BY PATHOLOGIST: CPT | Performed by: PATHOLOGY

## 2023-02-08 PROCEDURE — G8907 PT DOC NO EVENTS ON DISCHARG: HCPCS | Performed by: INTERNAL MEDICINE

## 2023-02-08 PROCEDURE — 45385 COLONOSCOPY W/LESION REMOVAL: CPT | Performed by: INTERNAL MEDICINE

## 2023-02-08 PROCEDURE — 45380 COLONOSCOPY AND BIOPSY: CPT | Performed by: INTERNAL MEDICINE

## 2023-02-08 RX ORDER — COLCHICINE 0.6 MG/1
1 TABLET TABLET, FILM COATED ORAL
Status: ON HOLD | COMMUNITY

## 2023-02-08 RX ORDER — RABEPRAZOLE SODIUM 20 MG/1
1 TABLET TABLET, DELAYED RELEASE ORAL ONCE A DAY
Qty: 30 TABLET | Refills: 3 | Status: ACTIVE | COMMUNITY
Start: 2022-12-22

## 2023-02-08 RX ORDER — ALLOPURINOL 300 MG/1
1/2 TAB TABLET ORAL ONCE A DAY
Status: ACTIVE | COMMUNITY

## 2023-02-08 RX ORDER — CHOLESTYRAMINE 4 G/9G
1 SCOOP POWDER, FOR SUSPENSION ORAL TWICE DAILY
Qty: 4 GM | Refills: 1 | Status: ACTIVE | COMMUNITY
Start: 2023-02-06

## 2023-03-14 ENCOUNTER — OFFICE VISIT (OUTPATIENT)
Dept: URBAN - METROPOLITAN AREA CLINIC 113 | Facility: CLINIC | Age: 73
End: 2023-03-14

## 2023-03-16 ENCOUNTER — OFFICE VISIT (OUTPATIENT)
Dept: URBAN - METROPOLITAN AREA SURGERY CENTER 25 | Facility: SURGERY CENTER | Age: 73
End: 2023-03-16

## 2023-03-20 ENCOUNTER — DASHBOARD ENCOUNTERS (OUTPATIENT)
Age: 73
End: 2023-03-20

## 2023-03-20 ENCOUNTER — OFFICE VISIT (OUTPATIENT)
Dept: URBAN - METROPOLITAN AREA CLINIC 113 | Facility: CLINIC | Age: 73
End: 2023-03-20
Payer: MEDICARE

## 2023-03-20 VITALS
RESPIRATION RATE: 18 BRPM | DIASTOLIC BLOOD PRESSURE: 69 MMHG | HEART RATE: 65 BPM | SYSTOLIC BLOOD PRESSURE: 108 MMHG | BODY MASS INDEX: 22.75 KG/M2 | WEIGHT: 168 LBS | HEIGHT: 72 IN | TEMPERATURE: 97.1 F

## 2023-03-20 DIAGNOSIS — Z86.010 HISTORY OF COLON POLYPS: ICD-10-CM

## 2023-03-20 DIAGNOSIS — Z80.0 FAMILY HISTORY OF COLON CANCER IN FATHER: ICD-10-CM

## 2023-03-20 DIAGNOSIS — R10.13 EPIGASTRIC PAIN: ICD-10-CM

## 2023-03-20 DIAGNOSIS — K31.1 PYLORIC STENOSIS IN ADULT: ICD-10-CM

## 2023-03-20 DIAGNOSIS — K21.9 GASTROESOPHAGEAL REFLUX DISEASE, UNSPECIFIED WHETHER ESOPHAGITIS PRESENT: ICD-10-CM

## 2023-03-20 DIAGNOSIS — R19.7 DIARRHEA OF PRESUMED INFECTIOUS ORIGIN: ICD-10-CM

## 2023-03-20 DIAGNOSIS — R49.0 VOICE HOARSENESS: ICD-10-CM

## 2023-03-20 DIAGNOSIS — K52.1 DRUG-INDUCED DIARRHEA: ICD-10-CM

## 2023-03-20 DIAGNOSIS — R63.4 WEIGHT LOSS: ICD-10-CM

## 2023-03-20 PROCEDURE — 99214 OFFICE O/P EST MOD 30 MIN: CPT

## 2023-03-20 RX ORDER — COLCHICINE 0.6 MG/1
1 TABLET TABLET, FILM COATED ORAL
Status: ON HOLD | COMMUNITY

## 2023-03-20 RX ORDER — CHOLESTYRAMINE 4 G/9G
1 SCOOP POWDER, FOR SUSPENSION ORAL TWICE DAILY
Qty: 4 GM | Refills: 1 | Status: ACTIVE | COMMUNITY
Start: 2023-02-06

## 2023-03-20 RX ORDER — ALLOPURINOL 300 MG/1
1/2 TAB TABLET ORAL ONCE A DAY
Status: ACTIVE | COMMUNITY

## 2023-03-20 RX ORDER — RABEPRAZOLE SODIUM 20 MG/1
1 TABLET TABLET, DELAYED RELEASE ORAL ONCE A DAY
Qty: 30 TABLET | Refills: 3 | Status: ON HOLD | COMMUNITY
Start: 2022-12-22

## 2023-03-20 NOTE — HPI-TODAY'S VISIT:
Patient is a very pleasant 71-year-old gentleman who returns today for follow-up.  I last saw the patient in August 2019.  At that time I was seeing him with abdominal pain.  Extensive work-up only revealed gallbladder sludge.  Celiac panel was negative.  Laboratory testing performed in October 2021 revealed a normal CBC.  Also, a normal CMP other than a sodium of 136.  Patient underwent cholecystectomy in November 2021. The patient states that both before his cholecystectomy and after he is having problems of heartburn in the evening especially if he has a mixed drink. He often has to sleep with the head of the bed up. He does not take any acid suppressive medications. He does take occasional nonsteroidal anti-inflammatory agents. He states his last colonoscopy was 5 years ago and 2 polyps were found, and he was told to have a repeat in 5 years. The patient comes in with a new problem.  He states he went to [place] a few weeks ago to do some surfing.  While he was there he developed severe right arm swelling.  This has improved dramatically.  He thought this might be gout, so he took prednisone tablets.  After that he started developing severe epigastric abdominal pain with nausea and vomiting.  This pain is made much worse with eating.  He has had no diarrhea. Interval history.  EGD performed February 7 revealed a tight pylorus stenosis.  In the interim he underwent surgical resection.  He was recently seen in consultation October 4 for diarrhea.  He had a high white cell count.  This was consistent with an acute gastroenteritis.  He resolved with fluid resuscitation.  He had acute renal failure associated with it. The patient was doing well up until 1 to 2 days ago when he started developing diarrhea bloating and some nausea.  He does not note any exposure.  His wife is not ill.  He did resume colchicine once he returned home. Interval history, 12/22/2022.  Upper endoscopy performed October 19 was unremarkable.  Simply showed his post surgical anatomy.  Flexible sigmoidoscopy was also unremarkable.  Biopsies of the small bowel were negative.  Biopsies of the stomach were negative.  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 The patient states he is doing very well now that he is staying off colchicine.  He does occasionally take prednisone for his gout.  His last colonoscopy as noted in the past was in 2018.  His father had a history of colon cancer, so he is due in 2023. Interval history, 1/23/2023.  The patient redeveloped diarrhea last week.  He states just before the diarrhea had normal laboratory testing by his primary care physician.  He states the diarrhea lasted about 7 days and is now gone away.  He has started on rabeprazole and has helped his heartburn greatly.  Interval history, 3/20/2023: 72-year-old male presents for follow-up after colonoscopy.  He was last seen on 1/23/2023.  He has had intermittent diarrhea with a recent episode of colchicine induced enterocolitis.  He has not been on colchicine and has therefore had severe gout flares.  In the past these flares tend to be associated with his diarrhea.  Flexible sigmoidoscopy did show drug-induced inflammation of the colon.  He was planned for a full colonoscopy.  He does also have a history of pyloric stenosis secondary to peptic ulcer disease status post surgery.  He was to continue rabeprazole due to his history of esophageal ulcer and acid reflux.  Patient had tried and failed Questran in the past however requested a secondary trial.  This was prescribed to take twice daily.  Colonoscopy 2/8/2023:Quality bowel prep was fair.  TI was normal.  Entire examined colon appeared normal, biopsies were taken from the ascending colon to evaluate for microscopic colitis.  This revealed no abnormality.  A 3 mm tubular adenoma was removed from the mid transverse colon.  A 4 mm hyperplastic polyp was removed in the distal rectum.  To stop active bleeding, 1 hemostatic clip was successfully placed.  Repeat colonoscopy in 5 years for surveillance.  He has lost 15 pounds over the last year or so. His baseline weight is 175. He has been training for surfing however he has always been active and eats regularly. He states he has undergone extensive workup for weight loss to include labs and CT imaging. He has not had a CT of his chest. He is able to control bouts of diarrhea with Imodium. He consumes very little meat. He is not able to find food triggers. He is trying to eat more food to gain his weight back. He states Cholestyramine is effective. He only used this for one day.

## 2023-04-18 ENCOUNTER — ERX REFILL RESPONSE (OUTPATIENT)
Dept: URBAN - METROPOLITAN AREA CLINIC 113 | Facility: CLINIC | Age: 73
End: 2023-04-18

## 2023-04-18 RX ORDER — RABEPRAZOLE SODIUM 20 MG/1
TAKE ONE TABLET BY MOUTH DAILY TABLET, DELAYED RELEASE ORAL
Qty: 30 TABLET | Refills: 11 | OUTPATIENT

## 2023-04-18 RX ORDER — RABEPRAZOLE SODIUM 20 MG/1
1 TABLET TABLET, DELAYED RELEASE ORAL ONCE A DAY
Qty: 30 TABLET | Refills: 3 | OUTPATIENT

## 2023-05-03 ENCOUNTER — TELEPHONE ENCOUNTER (OUTPATIENT)
Dept: URBAN - METROPOLITAN AREA CLINIC 113 | Facility: CLINIC | Age: 73
End: 2023-05-03

## 2023-05-03 RX ORDER — RABEPRAZOLE SODIUM 20 MG/1
1 TABLET TABLET, DELAYED RELEASE ORAL ONCE A DAY
Qty: 90 TABLET | Refills: 3

## 2025-04-28 ENCOUNTER — LAB OUTSIDE AN ENCOUNTER (OUTPATIENT)
Dept: URBAN - METROPOLITAN AREA CLINIC 107 | Facility: CLINIC | Age: 75
End: 2025-04-28

## 2025-04-28 ENCOUNTER — OFFICE VISIT (OUTPATIENT)
Dept: URBAN - METROPOLITAN AREA CLINIC 107 | Facility: CLINIC | Age: 75
End: 2025-04-28
Payer: MEDICARE

## 2025-04-28 DIAGNOSIS — Z80.0 FAMILY HISTORY OF COLON CANCER IN FATHER: ICD-10-CM

## 2025-04-28 DIAGNOSIS — R63.4 WEIGHT LOSS: ICD-10-CM

## 2025-04-28 DIAGNOSIS — K31.1 PYLORIC STENOSIS IN ADULT: ICD-10-CM

## 2025-04-28 DIAGNOSIS — R10.13 EPIGASTRIC PAIN: ICD-10-CM

## 2025-04-28 DIAGNOSIS — K52.1 DRUG-INDUCED DIARRHEA: ICD-10-CM

## 2025-04-28 DIAGNOSIS — K21.00 GASTROESOPHAGEAL REFLUX DISEASE WITH ESOPHAGITIS WITHOUT HEMORRHAGE: ICD-10-CM

## 2025-04-28 DIAGNOSIS — R49.0 VOICE HOARSENESS: ICD-10-CM

## 2025-04-28 DIAGNOSIS — Z86.0101 HISTORY OF ADENOMATOUS POLYP OF COLON: ICD-10-CM

## 2025-04-28 PROBLEM — 266433003: Status: ACTIVE | Noted: 2025-04-28

## 2025-04-28 PROBLEM — 266435005: Status: ACTIVE | Noted: 2025-04-28

## 2025-04-28 PROCEDURE — 99214 OFFICE O/P EST MOD 30 MIN: CPT | Performed by: INTERNAL MEDICINE

## 2025-04-28 RX ORDER — ALLOPURINOL 300 MG/1
1/2 TAB TABLET ORAL ONCE A DAY
Status: ACTIVE | COMMUNITY

## 2025-04-28 RX ORDER — PREDNISONE 10 MG/1
1 TABLET WITH FOOD OR MILK TABLET ORAL ONCE A DAY
Status: ACTIVE | COMMUNITY

## 2025-04-28 RX ORDER — COLCHICINE 0.6 MG/1
1 TABLET TABLET, FILM COATED ORAL
Status: ON HOLD | COMMUNITY

## 2025-04-28 RX ORDER — CHOLESTYRAMINE 4 G/9G
1 SCOOP POWDER, FOR SUSPENSION ORAL TWICE DAILY
Qty: 4 GM | Refills: 1 | Status: ACTIVE | COMMUNITY
Start: 2023-02-06

## 2025-04-28 RX ORDER — RABEPRAZOLE SODIUM 20 MG/1
1 TABLET TABLET, DELAYED RELEASE ORAL ONCE A DAY
Qty: 90 TABLET | Refills: 3 | Status: ACTIVE | COMMUNITY

## 2025-04-28 NOTE — HPI-TODAY'S VISIT:
Patient is a very pleasant 71-year-old gentleman who returns today for follow-up.  I last saw the patient in August 2019.  At that time I was seeing him with abdominal pain.  Extensive work-up only revealed gallbladder sludge.  Celiac panel was negative.  Laboratory testing performed in October 2021 revealed a normal CBC.  Also, a normal CMP other than a sodium of 136.  Patient underwent cholecystectomy in November 2021. The patient states that both before his cholecystectomy and after he is having problems of heartburn in the evening especially if he has a mixed drink. He often has to sleep with the head of the bed up. He does not take any acid suppressive medications. He does take occasional nonsteroidal anti-inflammatory agents. He states his last colonoscopy was 5 years ago and 2 polyps were found, and he was told to have a repeat in 5 years. The patient comes in with a new problem.  He states he went to [place] a few weeks ago to do some surfing.  While he was there he developed severe right arm swelling.  This has improved dramatically.  He thought this might be gout, so he took prednisone tablets.  After that he started developing severe epigastric abdominal pain with nausea and vomiting.  This pain is made much worse with eating.  He has had no diarrhea. Interval history.  EGD performed February 7 revealed a tight pylorus stenosis.  In the interim he underwent surgical resection.  He was recently seen in consultation October 4 for diarrhea.  He had a high white cell count.  This was consistent with an acute gastroenteritis.  He resolved with fluid resuscitation.  He had acute renal failure associated with it. The patient was doing well up until 1 to 2 days ago when he started developing diarrhea bloating and some nausea.  He does not note any exposure.  His wife is not ill.  He did resume colchicine once he returned home. Interval history, 12/22/2022.  Upper endoscopy performed October 19 was unremarkable.  Simply showed his post surgical anatomy.  Flexible sigmoidoscopy was also unremarkable.  Biopsies of the small bowel were negative.  Biopsies of the stomach were negative.  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 The patient states he is doing very well now that he is staying off colchicine.  He does occasionally take prednisone for his gout.  His last colonoscopy as noted in the past was in 2018.  His father had a history of colon cancer, so he is due in 2023. Interval history, 1/23/2023.  The patient redeveloped diarrhea last week.  He states just before the diarrhea had normal laboratory testing by his primary care physician.  He states the diarrhea lasted about 7 days and is now gone away.  He has started on rabeprazole and has helped his heartburn greatly.  Interval history, 3/20/2023: 72-year-old male presents for follow-up after colonoscopy.  He was last seen on 1/23/2023.  He has had intermittent diarrhea with a recent episode of colchicine induced enterocolitis.  He has not been on colchicine and has therefore had severe gout flares.  In the past these flares tend to be associated with his diarrhea.  Flexible sigmoidoscopy did show drug-induced inflammation of the colon.  He was planned for a full colonoscopy.  He does also have a history of pyloric stenosis secondary to peptic ulcer disease status post surgery.  He was to continue rabeprazole due to his history of esophageal ulcer and acid reflux.  Patient had tried and failed Questran in the past however requested a secondary trial.  This was prescribed to take twice daily.  Colonoscopy 2/8/2023:Quality bowel prep was fair.  TI was normal.  Entire examined colon appeared normal, biopsies were taken from the ascending colon to evaluate for microscopic colitis.  This revealed no abnormality.  A 3 mm tubular adenoma was removed from the mid transverse colon.  A 4 mm hyperplastic polyp was removed in the distal rectum.  To stop active bleeding, 1 hemostatic clip was successfully placed.  Repeat colonoscopy in 5 years for surveillance.  He has lost 15 pounds over the last year or so. His baseline weight is 175. He has been training for surfing however he has always been active and eats regularly. He states he has undergone extensive workup for weight loss to include labs and CT imaging. He has not had a CT of his chest. He is able to control bouts of diarrhea with Imodium. He consumes very little meat. He is not able to find food triggers. He is trying to eat more food to gain his weight back. He states Cholestyramine is effective. He only used this for one day. Interval history, 4/28/2025. Patient returns today after long interval absence.  Last seen in 2023. Referring records were reviewed.  Laboratory testing dated January 14 of this year revealed a normal CBC normal CMP except for glucose of 108.  PSA and TSH normal. The patient states he has had no new past medical or past surgical history in the last 2 years since we seen him.  He comes in now because he is having difficulty in gaining weight.  He would like to get up to 180 pounds.  He states what seems to be limiting his ability is the fact that he gets early satiety.  He does not have any significant diarrhea but does have some mild diarrhea.  We have performed a small bowel biopsies in the past looking for diarrheal illnesses without success.

## 2025-05-06 ENCOUNTER — CLAIMS CREATED FROM THE CLAIM WINDOW (OUTPATIENT)
Dept: URBAN - METROPOLITAN AREA SURGERY CENTER 25 | Facility: SURGERY CENTER | Age: 75
End: 2025-05-06
Payer: MEDICARE

## 2025-05-06 ENCOUNTER — CLAIMS CREATED FROM THE CLAIM WINDOW (OUTPATIENT)
Dept: URBAN - METROPOLITAN AREA CLINIC 4 | Facility: CLINIC | Age: 75
End: 2025-05-06
Payer: MEDICARE

## 2025-05-06 DIAGNOSIS — K29.60 OTHER GASTRITIS WITHOUT BLEEDING: ICD-10-CM

## 2025-05-06 DIAGNOSIS — K31.89 OTHER DISEASES OF STOMACH AND DUODENUM: ICD-10-CM

## 2025-05-06 DIAGNOSIS — R63.4 ABNORMAL INTENTIONAL WEIGHT LOSS: ICD-10-CM

## 2025-05-06 DIAGNOSIS — B96.81 HELICOBACTER PYLORI [H. PYLORI] AS THE CAUSE OF DISEASES CLASSIFIED ELSEWHERE: ICD-10-CM

## 2025-05-06 DIAGNOSIS — R10.13 ABDOMINAL DISCOMFORT, EPIGASTRIC: ICD-10-CM

## 2025-05-06 DIAGNOSIS — B96.81 BACTERIAL INFECTION DUE TO H. PYLORI: ICD-10-CM

## 2025-05-06 PROCEDURE — 88305 TISSUE EXAM BY PATHOLOGIST: CPT | Performed by: PATHOLOGY

## 2025-05-06 PROCEDURE — 00731 ANES UPR GI NDSC PX NOS: CPT | Performed by: NURSE ANESTHETIST, CERTIFIED REGISTERED

## 2025-05-06 PROCEDURE — 88342 IMHCHEM/IMCYTCHM 1ST ANTB: CPT | Performed by: PATHOLOGY

## 2025-05-06 PROCEDURE — 43239 EGD BIOPSY SINGLE/MULTIPLE: CPT | Performed by: CLINIC/CENTER

## 2025-05-06 PROCEDURE — 43239 EGD BIOPSY SINGLE/MULTIPLE: CPT | Performed by: INTERNAL MEDICINE

## 2025-05-06 RX ORDER — COLCHICINE 0.6 MG/1
1 TABLET TABLET, FILM COATED ORAL
Status: ON HOLD | COMMUNITY

## 2025-05-06 RX ORDER — CHOLESTYRAMINE 4 G/9G
1 SCOOP POWDER, FOR SUSPENSION ORAL TWICE DAILY
Qty: 4 GM | Refills: 1 | Status: ACTIVE | COMMUNITY
Start: 2023-02-06

## 2025-05-06 RX ORDER — ALLOPURINOL 300 MG/1
1/2 TAB TABLET ORAL ONCE A DAY
Status: ACTIVE | COMMUNITY

## 2025-05-06 RX ORDER — RABEPRAZOLE SODIUM 20 MG/1
1 TABLET TABLET, DELAYED RELEASE ORAL ONCE A DAY
Qty: 90 TABLET | Refills: 3 | Status: ACTIVE | COMMUNITY

## 2025-05-06 RX ORDER — PREDNISONE 10 MG/1
1 TABLET WITH FOOD OR MILK TABLET ORAL ONCE A DAY
Status: ACTIVE | COMMUNITY

## 2025-05-27 ENCOUNTER — TELEPHONE ENCOUNTER (OUTPATIENT)
Dept: URBAN - METROPOLITAN AREA CLINIC 107 | Facility: CLINIC | Age: 75
End: 2025-05-27

## 2025-05-27 ENCOUNTER — OFFICE VISIT (OUTPATIENT)
Dept: URBAN - METROPOLITAN AREA CLINIC 107 | Facility: CLINIC | Age: 75
End: 2025-05-27

## 2025-05-27 RX ORDER — DOXYCYCLINE HYCLATE 100 MG/1
1 CAPSULE CAPSULE ORAL TWICE DAILY
Qty: 28 | Refills: 0 | OUTPATIENT
Start: 2025-05-27 | End: 2025-06-10

## 2025-05-27 RX ORDER — BISMUTH SUBSALICYLATE 262 MG/1
1 TABLET TABLET, CHEWABLE ORAL
Qty: 56 TABLET | Refills: 0 | OUTPATIENT
Start: 2025-05-27 | End: 2025-06-10

## 2025-05-27 RX ORDER — PANTOPRAZOLE SODIUM 40 MG/1
1 TABLET TABLET, DELAYED RELEASE ORAL TWICE DAILY
Qty: 28 | Refills: 0 | OUTPATIENT
Start: 2025-05-27

## 2025-05-27 RX ORDER — METRONIDAZOLE 250 MG/1
1 TABLET TABLET ORAL
Qty: 56 TABLET | Refills: 0 | OUTPATIENT
Start: 2025-05-27 | End: 2025-06-10

## 2025-06-19 ENCOUNTER — OFFICE VISIT (OUTPATIENT)
Dept: URBAN - METROPOLITAN AREA CLINIC 113 | Facility: CLINIC | Age: 75
End: 2025-06-19
Payer: MEDICARE

## 2025-06-19 DIAGNOSIS — R49.0 VOICE HOARSENESS: ICD-10-CM

## 2025-06-19 DIAGNOSIS — R63.4 WEIGHT LOSS: ICD-10-CM

## 2025-06-19 DIAGNOSIS — R10.13 EPIGASTRIC PAIN: ICD-10-CM

## 2025-06-19 DIAGNOSIS — Z80.0 FAMILY HISTORY OF COLON CANCER IN FATHER: ICD-10-CM

## 2025-06-19 DIAGNOSIS — K52.1 DRUG-INDUCED DIARRHEA: ICD-10-CM

## 2025-06-19 DIAGNOSIS — R19.5 LOOSE STOOLS: ICD-10-CM

## 2025-06-19 DIAGNOSIS — Z86.0101 HISTORY OF ADENOMATOUS POLYP OF COLON: ICD-10-CM

## 2025-06-19 DIAGNOSIS — K31.1 PYLORIC STENOSIS IN ADULT: ICD-10-CM

## 2025-06-19 DIAGNOSIS — K21.00 GASTROESOPHAGEAL REFLUX DISEASE WITH ESOPHAGITIS WITHOUT HEMORRHAGE: ICD-10-CM

## 2025-06-19 DIAGNOSIS — A04.8 H. PYLORI INFECTION: ICD-10-CM

## 2025-06-19 PROCEDURE — 99214 OFFICE O/P EST MOD 30 MIN: CPT

## 2025-06-19 RX ORDER — PREDNISONE 10 MG/1
1 TABLET WITH FOOD OR MILK TABLET ORAL ONCE A DAY
Status: ACTIVE | COMMUNITY

## 2025-06-19 RX ORDER — RABEPRAZOLE SODIUM 20 MG/1
1 TABLET TABLET, DELAYED RELEASE ORAL ONCE A DAY
Qty: 90 TABLET | Refills: 3 | Status: ON HOLD | COMMUNITY

## 2025-06-19 RX ORDER — PANTOPRAZOLE SODIUM 40 MG/1
1 TABLET TABLET, DELAYED RELEASE ORAL TWICE DAILY
Qty: 28 | Refills: 0 | Status: ACTIVE | COMMUNITY
Start: 2025-05-27

## 2025-06-19 RX ORDER — COLCHICINE 0.6 MG/1
1 TABLET TABLET, FILM COATED ORAL
Status: ON HOLD | COMMUNITY

## 2025-06-19 RX ORDER — ALLOPURINOL 300 MG/1
1/2 TAB TABLET ORAL ONCE A DAY
Status: ACTIVE | COMMUNITY

## 2025-06-19 RX ORDER — CHOLESTYRAMINE 4 G/9G
1 SCOOP POWDER, FOR SUSPENSION ORAL TWICE DAILY
Qty: 4 GM | Refills: 1 | Status: ACTIVE | COMMUNITY
Start: 2023-02-06

## 2025-06-19 NOTE — HPI-TODAY'S VISIT:
Patient is a very pleasant 71-year-old gentleman who returns today for follow-up.  I last saw the patient in August 2019.  At that time I was seeing him with abdominal pain.  Extensive work-up only revealed gallbladder sludge.  Celiac panel was negative.  Laboratory testing performed in October 2021 revealed a normal CBC.  Also, a normal CMP other than a sodium of 136.  Patient underwent cholecystectomy in November 2021. The patient states that both before his cholecystectomy and after he is having problems of heartburn in the evening especially if he has a mixed drink. He often has to sleep with the head of the bed up. He does not take any acid suppressive medications. He does take occasional nonsteroidal anti-inflammatory agents. He states his last colonoscopy was 5 years ago and 2 polyps were found, and he was told to have a repeat in 5 years. The patient comes in with a new problem.  He states he went to [place] a few weeks ago to do some surfing.  While he was there he developed severe right arm swelling.  This has improved dramatically.  He thought this might be gout, so he took prednisone tablets.  After that he started developing severe epigastric abdominal pain with nausea and vomiting.  This pain is made much worse with eating.  He has had no diarrhea. Interval history.  EGD performed February 7 revealed a tight pylorus stenosis.  In the interim he underwent surgical resection.  He was recently seen in consultation October 4 for diarrhea.  He had a high white cell count.  This was consistent with an acute gastroenteritis.  He resolved with fluid resuscitation.  He had acute renal failure associated with it. The patient was doing well up until 1 to 2 days ago when he started developing diarrhea bloating and some nausea.  He does not note any exposure.  His wife is not ill.  He did resume colchicine once he returned home. Interval history, 12/22/2022.  Upper endoscopy performed October 19 was unremarkable.  Simply showed his post surgical anatomy.  Flexible sigmoidoscopy was also unremarkable.  Biopsies of the small bowel were negative.  Biopsies of the stomach were negative.  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 The patient states he is doing very well now that he is staying off colchicine.  He does occasionally take prednisone for his gout.  His last colonoscopy as noted in the past was in 2018.  His father had a history of colon cancer, so he is due in 2023. Interval history, 1/23/2023.  The patient redeveloped diarrhea last week.  He states just before the diarrhea had normal laboratory testing by his primary care physician.  He states the diarrhea lasted about 7 days and is now gone away.  He has started on rabeprazole and has helped his heartburn greatly.  Interval history, 3/20/2023: 72-year-old male presents for follow-up after colonoscopy.  He was last seen on 1/23/2023.  He has had intermittent diarrhea with a recent episode of colchicine induced enterocolitis.  He has not been on colchicine and has therefore had severe gout flares.  In the past these flares tend to be associated with his diarrhea.  Flexible sigmoidoscopy did show drug-induced inflammation of the colon.  He was planned for a full colonoscopy.  He does also have a history of pyloric stenosis secondary to peptic ulcer disease status post surgery.  He was to continue rabeprazole due to his history of esophageal ulcer and acid reflux.  Patient had tried and failed Questran in the past however requested a secondary trial.  This was prescribed to take twice daily.  Colonoscopy 2/8/2023:Quality bowel prep was fair.  TI was normal.  Entire examined colon appeared normal, biopsies were taken from the ascending colon to evaluate for microscopic colitis.  This revealed no abnormality.  A 3 mm tubular adenoma was removed from the mid transverse colon.  A 4 mm hyperplastic polyp was removed in the distal rectum.  To stop active bleeding, 1 hemostatic clip was successfully placed.  Repeat colonoscopy in 5 years for surveillance.  He has lost 15 pounds over the last year or so. His baseline weight is 175. He has been training for surfing however he has always been active and eats regularly. He states he has undergone extensive workup for weight loss to include labs and CT imaging. He has not had a CT of his chest. He is able to control bouts of diarrhea with Imodium. He consumes very little meat. He is not able to find food triggers. He is trying to eat more food to gain his weight back. He states Cholestyramine is effective. He only used this for one day. Interval history, 4/28/2025. Patient returns today after long interval absence.  Last seen in 2023. Referring records were reviewed.  Laboratory testing dated January 14 of this year revealed a normal CBC normal CMP except for glucose of 108.  PSA and TSH normal. The patient states he has had no new past medical or past surgical history in the last 2 years since we seen him.  He comes in now because he is having difficulty in gaining weight.  He would like to get up to 180 pounds.  He states what seems to be limiting his ability is the fact that he gets early satiety.  He does not have any significant diarrhea but does have some mild diarrhea.  We have performed a small bowel biopsies in the past looking for diarrheal illnesses without success.  Interval history, 6/19/2025: EGD 5/6/2025:Performed without difficulty.  Patchy mild mucosal changes characterized by altered textures found in the second portion of the duodenum and third portion of the duodenum.  Biopsies taken.  Pathology showed no abnormality.A deformity was found in the prepyloric region of the stomach.  Pylorus was wide open just adjacent to second small lumen unchanged from prior EGD and this is residual from prior peptic ulcer disease.  Patchy moderately erythematous mucosa without bleeding found in the gastric body and antrum.  Biopsies taken.  Pathology showed chronic Helicobacter gastritis.  Esophagus was normal.  He was started on quadruple bismuth therapy.  This was sent on 5/27. He finished the medication on 6/16. He is still concerned with his weight and loss of muscle mass. He states his stools have formed up since completing his H. pylori medications. he has multiple questions regarding his diet and PPI threapy. He only takes this when taking prednisone.  He is leaving on 7/13 for [place] and he will be gone for 16 days.

## 2025-06-27 ENCOUNTER — TELEPHONE ENCOUNTER (OUTPATIENT)
Dept: URBAN - METROPOLITAN AREA CLINIC 113 | Facility: CLINIC | Age: 75
End: 2025-06-27

## 2025-06-27 ENCOUNTER — WEB ENCOUNTER (OUTPATIENT)
Dept: URBAN - METROPOLITAN AREA CLINIC 113 | Facility: CLINIC | Age: 75
End: 2025-06-27

## 2025-06-27 PROBLEM — 47367009: Status: ACTIVE | Noted: 2025-06-27

## 2025-06-27 LAB
% LIPID: 3.62
FAT,(FECAL LIPIDS)QN: (no result)
Lab: (no result)
PANCREATIC ELASTASE, FECAL: 88
TOTAL SPECIMEN WEIGHT: 11

## 2025-06-27 RX ORDER — PANCRELIPASE 36000; 180000; 114000 [USP'U]/1; [USP'U]/1; [USP'U]/1
2 CAPSULES WITH MEALS AND 1 TO 2 CAPSULE WITH SNACKS UP TO 10 CAPSULES PER DAY CAPSULE, DELAYED RELEASE PELLETS ORAL
Qty: 900 | Refills: 3 | OUTPATIENT
Start: 2025-06-27

## 2025-06-30 ENCOUNTER — TELEPHONE ENCOUNTER (OUTPATIENT)
Dept: URBAN - METROPOLITAN AREA CLINIC 113 | Facility: CLINIC | Age: 75
End: 2025-06-30

## 2025-07-07 ENCOUNTER — TELEPHONE ENCOUNTER (OUTPATIENT)
Dept: URBAN - METROPOLITAN AREA CLINIC 113 | Facility: CLINIC | Age: 75
End: 2025-07-07

## 2025-07-23 ENCOUNTER — OFFICE VISIT (OUTPATIENT)
Dept: URBAN - METROPOLITAN AREA CLINIC 113 | Facility: CLINIC | Age: 75
End: 2025-07-23

## 2025-08-27 ENCOUNTER — OFFICE VISIT (OUTPATIENT)
Dept: URBAN - METROPOLITAN AREA CLINIC 113 | Facility: CLINIC | Age: 75
End: 2025-08-27
Payer: MEDICARE

## 2025-08-27 DIAGNOSIS — R49.0 VOICE HOARSENESS: ICD-10-CM

## 2025-08-27 DIAGNOSIS — Z80.0 FAMILY HISTORY OF COLON CANCER IN FATHER: ICD-10-CM

## 2025-08-27 DIAGNOSIS — Z86.0101 HISTORY OF ADENOMATOUS POLYP OF COLON: ICD-10-CM

## 2025-08-27 DIAGNOSIS — K21.9 GASTROESOPHAGEAL REFLUX DISEASE, UNSPECIFIED WHETHER ESOPHAGITIS PRESENT: ICD-10-CM

## 2025-08-27 DIAGNOSIS — A04.8 H. PYLORI INFECTION: ICD-10-CM

## 2025-08-27 DIAGNOSIS — R10.13 EPIGASTRIC PAIN: ICD-10-CM

## 2025-08-27 DIAGNOSIS — R19.7 ACUTE DIARRHEA: ICD-10-CM

## 2025-08-27 DIAGNOSIS — K86.81 EXOCRINE PANCREATIC INSUFFICIENCY: ICD-10-CM

## 2025-08-27 DIAGNOSIS — R63.4 WEIGHT LOSS: ICD-10-CM

## 2025-08-27 DIAGNOSIS — K31.1 PYLORIC STENOSIS IN ADULT: ICD-10-CM

## 2025-08-27 PROCEDURE — 99214 OFFICE O/P EST MOD 30 MIN: CPT | Performed by: INTERNAL MEDICINE

## 2025-08-27 RX ORDER — RABEPRAZOLE SODIUM 20 MG/1
1 TABLET TABLET, DELAYED RELEASE ORAL ONCE A DAY
Qty: 90 TABLET | Refills: 3 | Status: ON HOLD | COMMUNITY

## 2025-08-27 RX ORDER — PANCRELIPASE 36000; 180000; 114000 [USP'U]/1; [USP'U]/1; [USP'U]/1
2 CAPSULES WITH MEALS AND 1 TO 2 CAPSULE WITH SNACKS UP TO 10 CAPSULES PER DAY CAPSULE, DELAYED RELEASE PELLETS ORAL
Qty: 900 | Refills: 3 | Status: ACTIVE | COMMUNITY
Start: 2025-06-27

## 2025-08-27 RX ORDER — COLCHICINE 0.6 MG/1
1 TABLET TABLET, FILM COATED ORAL
Status: ON HOLD | COMMUNITY

## 2025-08-27 RX ORDER — CHOLESTYRAMINE 4 G/9G
1 SCOOP POWDER, FOR SUSPENSION ORAL TWICE DAILY
Qty: 4 GM | Refills: 1 | Status: ACTIVE | COMMUNITY
Start: 2023-02-06

## 2025-08-27 RX ORDER — PANTOPRAZOLE SODIUM 40 MG/1
1 TABLET TABLET, DELAYED RELEASE ORAL TWICE DAILY
Qty: 28 | Refills: 0 | Status: ACTIVE | COMMUNITY
Start: 2025-05-27

## 2025-08-27 RX ORDER — PREDNISONE 10 MG/1
1 TABLET WITH FOOD OR MILK TABLET ORAL ONCE A DAY
Status: ACTIVE | COMMUNITY

## 2025-08-27 RX ORDER — ALLOPURINOL 300 MG/1
1/2 TAB TABLET ORAL ONCE A DAY
Status: ACTIVE | COMMUNITY